# Patient Record
Sex: FEMALE | Race: WHITE | NOT HISPANIC OR LATINO
[De-identification: names, ages, dates, MRNs, and addresses within clinical notes are randomized per-mention and may not be internally consistent; named-entity substitution may affect disease eponyms.]

---
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
## 2019-09-27 ENCOUNTER — TRANSCRIPTION ENCOUNTER (OUTPATIENT)
Age: 19
End: 2019-09-27

## 2021-11-22 ENCOUNTER — APPOINTMENT (OUTPATIENT)
Dept: ORTHOPEDIC SURGERY | Facility: CLINIC | Age: 21
End: 2021-11-22
Payer: COMMERCIAL

## 2021-11-22 ENCOUNTER — TRANSCRIPTION ENCOUNTER (OUTPATIENT)
Age: 21
End: 2021-11-22

## 2021-11-22 VITALS — HEIGHT: 63 IN | BODY MASS INDEX: 27.46 KG/M2 | WEIGHT: 155 LBS

## 2021-11-22 VITALS — DIASTOLIC BLOOD PRESSURE: 79 MMHG | HEART RATE: 79 BPM | SYSTOLIC BLOOD PRESSURE: 145 MMHG

## 2021-11-22 DIAGNOSIS — S93.402A SPRAIN OF UNSPECIFIED LIGAMENT OF LEFT ANKLE, INITIAL ENCOUNTER: ICD-10-CM

## 2021-11-22 PROCEDURE — 99203 OFFICE O/P NEW LOW 30 MIN: CPT

## 2021-11-22 PROCEDURE — 73610 X-RAY EXAM OF ANKLE: CPT | Mod: LT

## 2021-11-24 ENCOUNTER — APPOINTMENT (OUTPATIENT)
Dept: ORTHOPEDIC SURGERY | Facility: CLINIC | Age: 21
End: 2021-11-24

## 2021-11-24 PROBLEM — S93.402A LEFT ANKLE SPRAIN: Status: ACTIVE | Noted: 2021-11-24

## 2021-11-24 NOTE — HISTORY OF PRESENT ILLNESS
[de-identified] : 21 year old female presents today with left ankle inversion injury sustained on 11/12/21. She slipped on wet leaves and fell down the stairs and inverted her ankle. She has had multiple injuries to bilateral ankle before. 5-6 times of instability episodes to bilateral ankles. The pain has been getting progressively worse. She states that this time it's different because the pain is constant with rest. Takes Aleve as needed. Denies instability, numbness or tingling. Recalls fracturing her ankle at a young age but not sure which one. Was seen at urgent care in [place] and x-ray were negative for fx. \par \par The patient's past medical history, past surgical history, medications and allergies were reviewed by me today with the patient and documented accordingly. In addition, the patient's family and social history, which were noncontributory to this visit, were reviewed also.

## 2021-11-24 NOTE — DISCUSSION/SUMMARY
[de-identified] : 20 y/o female with left ankle sprain. \par \par The patient presents with an acute inversion injury to the left ankle with an injury to the lateral ligamentous complex. She has hx of multiple instability episodes to the ankle. I outlined a treatment protocol that will require a period of activity modification and relative rest. Further nonoperative modalities of treatment include relative rest from impact loading exercise, NSAID's/tylenol prn, cold compress for swelling control, compressive wraps/bracing, gradual return to weight bearing and load bearing exercise with or without the guidance of physical therapy for balance/proprioceptive/strength training.\par \par In addition, I discussed the spectrum of sprain injuries and short and long term outcomes. The majority of sprain respond well to nonoperative modalities of treatment, and the indication for surgical management is typically reserved for ankle joints that become chronically and grossly unstable.\par \par Recommendation: Begin trial of PT, Rx given. Lace-up ankle use.  Ice/Tylenol/NSAIDs p.r.n..\par \par Follow up as needed.

## 2021-11-24 NOTE — PHYSICAL EXAM
[de-identified] : Oriented to time, place, person\par Mood: Normal\par Affect: Normal\par Appearance: Healthy, well appearing, no acute distress.\par Gait: Normal\par Assistive Devices: None\par \par Left Ankle exam:\par \par Skin: Clean, dry, intact\par Inspection: No obvious malalignment, +mild swelling, no effusion\par Pulses: 2+ DP/PT pulses \par ROM: 10 degrees of dorsiflexion, 30 degrees of plantarflexion, normal subtalar motion. Pain with eversion,inversion. \par Tenderness: No tenderness over the lateral malleolus, no CFL +ATFL + PTFL pain. No medial malleolus pain, no deltoid ligament pain. No proximal fibular pain. No heel pain.\par Stability: Negative anterior drawer, negative posterior drawer.\par Strength: 5/5 TA/GS/EHL 5/5 inversion/eversion\par Neuro: In tact to light touch throughout\par Additional tests: Negative syndesmosis squeeze test.  [de-identified] : The following radiographs were ordered and read by me during this patients visit. I reviewed each radiograph in detail with the patient and discussed the findings as highlighted below. \par \par 3 views of the left ankle were obtained today, 11/22/2021, that show no acute fracture or dislocation. There is no significant degenerative change seen. There is no gross malalignment. Ankle mortise is intact. No significant other obvious acute osseous abnormality, otherwise unremarkable.

## 2021-11-24 NOTE — ADDENDUM
[FreeTextEntry1] : This note was written by Halie Menon on 11/22/2021 acting solely as a scribe for Dr. Ghulam Trejo.\par \par All medical record entries made by the Scribe were at my, Dr. Ghulam Trejo, direction and personally dictated by me on 11/22/2021. I have personally reviewed the chart and agree that the record accurately reflects my personal performance of the history, physical exam, assessment and plan.

## 2022-07-19 ENCOUNTER — LAB REQUISITION (OUTPATIENT)
Dept: LAB | Facility: HOSPITAL | Age: 22
End: 2022-07-19
Attending: INTERNAL MEDICINE
Payer: COMMERCIAL

## 2022-07-19 DIAGNOSIS — Z00.8 ENCOUNTER FOR OTHER GENERAL EXAMINATION: ICD-10-CM

## 2022-07-19 PROCEDURE — 86480 TB TEST CELL IMMUN MEASURE: CPT | Performed by: INTERNAL MEDICINE

## 2022-07-19 PROCEDURE — 86706 HEP B SURFACE ANTIBODY: CPT | Performed by: INTERNAL MEDICINE

## 2022-07-20 LAB — HBV SURFACE AB SER QL: REACTIVE

## 2022-07-21 LAB
M TB IFN-G BLD-IMP: NEGATIVE
MITOGEN-NIL: >10 IU/ML
NIL: 0.04 IU/ML
TB AG-NIL: 0 IU/ML
TB2 AG - NIL: 0 IU/ML

## 2023-03-01 ENCOUNTER — HOSPITAL ENCOUNTER (EMERGENCY)
Facility: HOSPITAL | Age: 23
Discharge: HOME | End: 2023-03-01
Attending: EMERGENCY MEDICINE
Payer: COMMERCIAL

## 2023-03-01 VITALS
HEIGHT: 63 IN | WEIGHT: 175 LBS | DIASTOLIC BLOOD PRESSURE: 67 MMHG | OXYGEN SATURATION: 99 % | BODY MASS INDEX: 31.01 KG/M2 | TEMPERATURE: 96.8 F | RESPIRATION RATE: 18 BRPM | HEART RATE: 90 BPM | SYSTOLIC BLOOD PRESSURE: 123 MMHG

## 2023-03-01 DIAGNOSIS — R51.9 ACUTE NONINTRACTABLE HEADACHE, UNSPECIFIED HEADACHE TYPE: ICD-10-CM

## 2023-03-01 DIAGNOSIS — R11.2 NAUSEA AND VOMITING, UNSPECIFIED VOMITING TYPE: Primary | ICD-10-CM

## 2023-03-01 LAB
ALBUMIN SERPL-MCNC: 4.9 G/DL (ref 3.4–5)
ALP SERPL-CCNC: 62 IU/L (ref 35–126)
ALT SERPL-CCNC: 28 IU/L (ref 11–54)
ANION GAP SERPL CALC-SCNC: 9 MEQ/L (ref 3–15)
AST SERPL-CCNC: 30 IU/L (ref 15–41)
BASOPHILS # BLD: 0.05 K/UL (ref 0.01–0.1)
BASOPHILS NFR BLD: 0.7 %
BILIRUB SERPL-MCNC: 1 MG/DL (ref 0.3–1.2)
BUN SERPL-MCNC: 14 MG/DL (ref 8–20)
CALCIUM SERPL-MCNC: 9.9 MG/DL (ref 8.9–10.3)
CHLORIDE SERPL-SCNC: 104 MEQ/L (ref 98–109)
CO2 SERPL-SCNC: 23 MEQ/L (ref 22–32)
CREAT SERPL-MCNC: 0.8 MG/DL (ref 0.6–1.1)
DIFFERENTIAL METHOD BLD: ABNORMAL
EOSINOPHIL # BLD: 0.17 K/UL (ref 0.04–0.36)
EOSINOPHIL NFR BLD: 2.2 %
ERYTHROCYTE [DISTWIDTH] IN BLOOD BY AUTOMATED COUNT: 11.6 % (ref 11.7–14.4)
GFR SERPL CREATININE-BSD FRML MDRD: >60 ML/MIN/1.73M*2
GLUCOSE SERPL-MCNC: 93 MG/DL (ref 70–99)
HCG UR QL: NEGATIVE
HCT VFR BLDCO AUTO: 46.2 % (ref 35–45)
HGB BLD-MCNC: 15.2 G/DL (ref 11.8–15.7)
IMM GRANULOCYTES # BLD AUTO: 0.01 K/UL (ref 0–0.08)
IMM GRANULOCYTES NFR BLD AUTO: 0.1 %
LITHIUM SERPL-SCNC: 0.5 MEQ/L (ref 0.5–1)
LYMPHOCYTES # BLD: 1.98 K/UL (ref 1.2–3.5)
LYMPHOCYTES NFR BLD: 25.9 %
MCH RBC QN AUTO: 30.6 PG (ref 28–33.2)
MCHC RBC AUTO-ENTMCNC: 32.9 G/DL (ref 32.2–35.5)
MCV RBC AUTO: 93 FL (ref 83–98)
MONOCYTES # BLD: 0.6 K/UL (ref 0.28–0.8)
MONOCYTES NFR BLD: 7.9 %
NEUTROPHILS # BLD: 4.83 K/UL (ref 1.7–7)
NEUTS SEG NFR BLD: 63.2 %
NRBC BLD-RTO: 0 %
PDW BLD AUTO: 10 FL (ref 9.4–12.3)
PLATELET # BLD AUTO: 290 K/UL (ref 150–369)
POTASSIUM SERPL-SCNC: 4.6 MEQ/L (ref 3.6–5.1)
PROT SERPL-MCNC: 8.7 G/DL (ref 6–8.2)
RBC # BLD AUTO: 4.97 M/UL (ref 3.93–5.22)
SODIUM SERPL-SCNC: 136 MEQ/L (ref 136–144)
WBC # BLD AUTO: 7.64 K/UL (ref 3.8–10.5)

## 2023-03-01 PROCEDURE — 80053 COMPREHEN METABOLIC PANEL: CPT

## 2023-03-01 PROCEDURE — 96375 TX/PRO/DX INJ NEW DRUG ADDON: CPT

## 2023-03-01 PROCEDURE — 3E0333Z INTRODUCTION OF ANTI-INFLAMMATORY INTO PERIPHERAL VEIN, PERCUTANEOUS APPROACH: ICD-10-PCS | Performed by: EMERGENCY MEDICINE

## 2023-03-01 PROCEDURE — 84703 CHORIONIC GONADOTROPIN ASSAY: CPT

## 2023-03-01 PROCEDURE — 84703 CHORIONIC GONADOTROPIN ASSAY: CPT | Performed by: EMERGENCY MEDICINE

## 2023-03-01 PROCEDURE — 85025 COMPLETE CBC W/AUTO DIFF WBC: CPT

## 2023-03-01 PROCEDURE — 36415 COLL VENOUS BLD VENIPUNCTURE: CPT

## 2023-03-01 PROCEDURE — 99284 EMERGENCY DEPT VISIT MOD MDM: CPT | Mod: 25

## 2023-03-01 PROCEDURE — 80178 ASSAY OF LITHIUM: CPT | Performed by: EMERGENCY MEDICINE

## 2023-03-01 PROCEDURE — 63600000 HC DRUGS/DETAIL CODE: Performed by: PHYSICIAN ASSISTANT

## 2023-03-01 PROCEDURE — 80178 ASSAY OF LITHIUM: CPT

## 2023-03-01 PROCEDURE — 3E033GC INTRODUCTION OF OTHER THERAPEUTIC SUBSTANCE INTO PERIPHERAL VEIN, PERCUTANEOUS APPROACH: ICD-10-PCS | Performed by: EMERGENCY MEDICINE

## 2023-03-01 PROCEDURE — 85025 COMPLETE CBC W/AUTO DIFF WBC: CPT | Performed by: EMERGENCY MEDICINE

## 2023-03-01 PROCEDURE — 25800000 HC PHARMACY IV SOLUTIONS: Performed by: PHYSICIAN ASSISTANT

## 2023-03-01 PROCEDURE — 96374 THER/PROPH/DIAG INJ IV PUSH: CPT

## 2023-03-01 PROCEDURE — 80053 COMPREHEN METABOLIC PANEL: CPT | Performed by: EMERGENCY MEDICINE

## 2023-03-01 RX ORDER — KETOROLAC TROMETHAMINE 15 MG/ML
15 INJECTION, SOLUTION INTRAMUSCULAR; INTRAVENOUS ONCE
Status: COMPLETED | OUTPATIENT
Start: 2023-03-01 | End: 2023-03-01

## 2023-03-01 RX ORDER — LITHIUM CARBONATE 300 MG/1
CAPSULE ORAL SEE ADMIN INSTRUCTIONS
COMMUNITY
Start: 2023-02-22

## 2023-03-01 RX ORDER — ONDANSETRON HYDROCHLORIDE 2 MG/ML
4 INJECTION, SOLUTION INTRAVENOUS ONCE
Status: COMPLETED | OUTPATIENT
Start: 2023-03-01 | End: 2023-03-01

## 2023-03-01 RX ORDER — LAMOTRIGINE 150 MG/1
150 TABLET ORAL EVERY 12 HOURS
COMMUNITY
Start: 2023-02-02

## 2023-03-01 RX ADMIN — KETOROLAC TROMETHAMINE 15 MG: 15 INJECTION, SOLUTION INTRAMUSCULAR; INTRAVENOUS at 13:50

## 2023-03-01 RX ADMIN — ONDANSETRON 4 MG: 2 INJECTION INTRAMUSCULAR; INTRAVENOUS at 13:50

## 2023-03-01 RX ADMIN — SODIUM CHLORIDE 1000 ML: 9 INJECTION, SOLUTION INTRAVENOUS at 13:49

## 2023-03-01 ASSESSMENT — ENCOUNTER SYMPTOMS
ABDOMINAL PAIN: 0
COUGH: 0
DIZZINESS: 1
DIARRHEA: 0
FEVER: 0
HEADACHES: 1
DYSURIA: 0
SHORTNESS OF BREATH: 0
FATIGUE: 0
NAUSEA: 1
VOMITING: 1

## 2023-03-01 NOTE — ED PROVIDER NOTES
Emergency Medicine Note  HPI   HISTORY OF PRESENT ILLNESS     22-year-old female with a history of bipolar disorder presents to the emergency department complaining of nausea and vomiting and states that this morning she has been unable to keep down any fluids.  She also complains of a headache since yesterday.  She states she saw her psychiatrist last week and was started on lithium.  She was also advised to taper her other medications.  She states she stopped of the Rexulti, metformin and gabapentin all at once and started taking the lithium.  She has an appointment scheduled with her psychiatrist and with her family doctor.  She denies chance of pregnancy.  She denies fever, abdominal pain or diarrhea.  She states she has a history of migraines and has a once monthly injection.  She states otherwise if she develops a headache she usually just goes to sleep and it is gone in the morning.  She states her headache was still present this morning.  It has already started to resolve.      History provided by:  Patient   used: No    Vomiting  Severity:  Moderate  Duration:  2 days  Timing:  Constant  Progression:  Unchanged  Chronicity:  New  Recent urination:  Normal  Relieved by:  Nothing  Worsened by:  Liquids  Ineffective treatments:  None tried  Associated symptoms: headaches    Associated symptoms: no abdominal pain, no cough, no diarrhea and no fever          Patient History   PAST HISTORY     Reviewed from Nursing Triage:       History reviewed. No pertinent past medical history.    History reviewed. No pertinent surgical history.    History reviewed. No pertinent family history.    Social History     Tobacco Use   • Smoking status: Never   • Smokeless tobacco: Never   Substance Use Topics   • Alcohol use: Yes     Comment: 2-3 times a week   • Drug use: Never         Review of Systems   REVIEW OF SYSTEMS     Review of Systems   Constitutional: Negative for fatigue and fever.   Respiratory:  Negative for cough and shortness of breath.    Gastrointestinal: Positive for nausea and vomiting. Negative for abdominal pain and diarrhea.   Genitourinary: Negative for dysuria.   Neurological: Positive for dizziness and headaches.   All other systems reviewed and are negative.        VITALS     ED Vitals    Date/Time Temp Pulse Resp BP SpO2 [facility]   03/01/23 1348 36 °C (96.8 °F) 90 18 123/67 99 % KP   03/01/23 1210 36.9 °C (98.5 °F) 91 18 122/82 100 % SLS                       Physical Exam   PHYSICAL EXAM     Physical Exam  Vitals and nursing note reviewed.   Constitutional:       Appearance: Normal appearance.   HENT:      Head: Normocephalic.      Mouth/Throat:      Mouth: Mucous membranes are moist.      Pharynx: Oropharynx is clear.   Cardiovascular:      Rate and Rhythm: Normal rate and regular rhythm.      Pulses: Normal pulses.      Heart sounds: Normal heart sounds.   Pulmonary:      Effort: Pulmonary effort is normal.      Breath sounds: Normal breath sounds.   Abdominal:      General: Abdomen is flat. Bowel sounds are normal.      Tenderness: There is no abdominal tenderness.   Skin:     General: Skin is warm and dry.      Capillary Refill: Capillary refill takes less than 2 seconds.   Neurological:      General: No focal deficit present.      Mental Status: She is alert and oriented to person, place, and time.   Psychiatric:         Mood and Affect: Mood normal.           PROCEDURES     Procedures     DATA     Results     Procedure Component Value Units Date/Time    Comprehensive metabolic panel [[id number]]  (Abnormal) Collected: 03/01/23 1224    Specimen: Blood, Venous Updated: 03/01/23 1300     Sodium 136 mEQ/L      Potassium 4.6 mEQ/L      Comment: Results obtained on plasma. Plasma Potassium values may be up to 0.4 mEQ/L less than serum values. The differences may be greater for patients with high platelet or white cell counts.  SLIGHT HEMOLYSIS, RESULT MAY BE INCREASED.        Chloride 104 mEQ/L       CO2 23 mEQ/L      BUN 14 mg/dL      Creatinine 0.8 mg/dL      Glucose 93 mg/dL      Calcium 9.9 mg/dL      AST (SGOT) 30 IU/L      Comment: SLIGHT HEMOLYSIS, RESULT MAY BE INCREASED.        ALT (SGPT) 28 IU/L      Comment: SLIGHT HEMOLYSIS, RESULT MAY BE INCREASED.        Alkaline Phosphatase 62 IU/L      Total Protein 8.7 g/dL      Comment: Test performed on plasma which typically contains approximately 0.4 g/dL more protein than serum.        Albumin 4.9 g/dL      Bilirubin, Total 1.0 mg/dL      Comment: SLIGHT HEMOLYSIS, RESULT MAY BE INCREASED.        eGFR >60.0 mL/min/1.73m*2      Anion Gap 9 mEQ/L     Lithium [[id number]]  (Normal) Collected: 03/01/23 1224    Specimen: Blood, Venous Updated: 03/01/23 1300     [place] Lvl 0.5 mEQ/L     BhCG, Serum, Qual [[id number]]  (Normal) Collected: 03/01/23 1224    Specimen: Blood, Venous Updated: 03/01/23 1245     Preg Test, Serum Negative    CBC and differential [[id number]]  (Abnormal) Collected: 03/01/23 1224    Specimen: Blood, Venous Updated: 03/01/23 1235     WBC 7.64 K/uL      RBC 4.97 M/uL      Hemoglobin 15.2 g/dL      Hematocrit 46.2 %      MCV 93.0 fL      MCH 30.6 pg      MCHC 32.9 g/dL      RDW 11.6 %      Platelets 290 K/uL      MPV 10.0 fL      Differential Type Auto     nRBC 0.0 %      Immature Granulocytes 0.1 %      Neutrophils 63.2 %      Lymphocytes 25.9 %      Monocytes 7.9 %      Eosinophils 2.2 %      Basophils 0.7 %      Immature Granulocytes, Absolute 0.01 K/uL      Neutrophils, Absolute 4.83 K/uL      Lymphocytes, Absolute 1.98 K/uL      Monocytes, Absolute 0.60 K/uL      Eosinophils, Absolute 0.17 K/uL      Basophils, Absolute 0.05 K/uL     [place] Draw Panel [[id number]] Collected: 03/01/23 1224    Specimen: Blood, Venous Updated: 03/01/23 1230    Narrative:      The following orders were created for panel order [place] Draw Panel.  Procedure                               Abnormality         Status                     ---------                                -----------         ------                     RAINBOW GOLD[[id number]]                                     In process                   Please view results for these tests on the individual orders.    RAINBOW GOLD [[id number]] Collected: 03/01/23 1224    Specimen: Blood, Venous Updated: 03/01/23 1230    [place] Draw Panel [[id number]] Collected: 03/01/23 1224    Specimen: Blood, Venous Updated: 03/01/23 1230    Narrative:      The following orders were created for panel order [place] Draw Panel.  Procedure                               Abnormality         Status                     ---------                               -----------         ------                     CHETAN PIRES BLUE[[id number]]                                  In process                   Please view results for these tests on the individual orders.    CHETAN CROW [[id number]] Collected: 03/01/23 1224    Specimen: Blood, Venous Updated: 03/01/23 1230          Imaging Results    None         No orders to display       Scoring tools                                  ED Course & MDM   MDM / ED COURSE / CLINICAL IMPRESSION / DISPO     MDM    ED Course as of 03/01/23 1605   Wed Mar 01, 2023   1604 Patient's work-up is largely reassuring.  Her symptoms completely resolved with Toradol Zofran and fluids.  She is feeling well and will follow-up with her psychiatrist and family doctor.  Advised to return for worsening symptoms.  She is comfortable with discharge plan. [CB]      ED Course User Index  [CB] Margi Butt PA     Clinical Impression      Nausea and vomiting, unspecified vomiting type   Acute nonintractable headache, unspecified headache type     _________________     ED Disposition   Discharge                   Margi Butt PA  03/01/23 1605

## 2023-03-01 NOTE — DISCHARGE INSTRUCTIONS
Drink plenty of fluids.  Continue lithium as prescribed and follow-up with your psychiatrist and family doctor as previously scheduled.  Return to the emergency department for any worsening symptoms.

## 2023-03-02 NOTE — ED ATTESTATION NOTE
Patient was seen by the PA/NP.  Agree with note and management.       Samuel Hector MD  03/01/23 3401

## 2023-07-21 ENCOUNTER — OUTPATIENT (OUTPATIENT)
Dept: OUTPATIENT SERVICES | Facility: HOSPITAL | Age: 23
LOS: 1 days | End: 2023-07-21
Payer: COMMERCIAL

## 2023-07-21 ENCOUNTER — APPOINTMENT (OUTPATIENT)
Dept: MRI IMAGING | Facility: CLINIC | Age: 23
End: 2023-07-21
Payer: COMMERCIAL

## 2023-07-21 DIAGNOSIS — Z00.8 ENCOUNTER FOR OTHER GENERAL EXAMINATION: ICD-10-CM

## 2023-07-21 PROCEDURE — 73221 MRI JOINT UPR EXTREM W/O DYE: CPT | Mod: 26,RT

## 2023-07-21 PROCEDURE — 73221 MRI JOINT UPR EXTREM W/O DYE: CPT
